# Patient Record
Sex: MALE | Race: WHITE
[De-identification: names, ages, dates, MRNs, and addresses within clinical notes are randomized per-mention and may not be internally consistent; named-entity substitution may affect disease eponyms.]

---

## 2021-06-17 ENCOUNTER — HOSPITAL ENCOUNTER (EMERGENCY)
Dept: HOSPITAL 63 - ER | Age: 56
Discharge: HOME | End: 2021-06-17
Payer: SELF-PAY

## 2021-06-17 VITALS — BODY MASS INDEX: 39.49 KG/M2 | WEIGHT: 260.59 LBS | HEIGHT: 68 IN

## 2021-06-17 VITALS — SYSTOLIC BLOOD PRESSURE: 156 MMHG | DIASTOLIC BLOOD PRESSURE: 88 MMHG

## 2021-06-17 DIAGNOSIS — N20.1: Primary | ICD-10-CM

## 2021-06-17 DIAGNOSIS — Z87.442: ICD-10-CM

## 2021-06-17 DIAGNOSIS — E11.9: ICD-10-CM

## 2021-06-17 LAB
ALBUMIN SERPL-MCNC: 4 G/DL (ref 3.4–5)
ALBUMIN/GLOB SERPL: 1.2 {RATIO} (ref 1–1.7)
ALP SERPL-CCNC: 76 U/L (ref 46–116)
ALT SERPL-CCNC: 70 U/L (ref 16–63)
ANION GAP SERPL CALC-SCNC: 12 MMOL/L (ref 6–14)
APTT PPP: (no result) S
AST SERPL-CCNC: 27 U/L (ref 15–37)
BACTERIA #/AREA URNS HPF: 0 /HPF
BASOPHILS # BLD AUTO: 0.1 X10^3/UL (ref 0–0.2)
BASOPHILS NFR BLD: 1 % (ref 0–3)
BILIRUB SERPL-MCNC: 0.5 MG/DL (ref 0.2–1)
BILIRUB UR QL STRIP: (no result)
BUN/CREAT SERPL: 13 (ref 6–20)
CA-I SERPL ISE-MCNC: 18 MG/DL (ref 8–26)
CALCIUM SERPL-MCNC: 9.4 MG/DL (ref 8.5–10.1)
CHLORIDE SERPL-SCNC: 106 MMOL/L (ref 98–107)
CO2 SERPL-SCNC: 25 MMOL/L (ref 21–32)
CREAT SERPL-MCNC: 1.4 MG/DL (ref 0.7–1.3)
EOSINOPHIL NFR BLD: 0.3 X10^3/UL (ref 0–0.7)
EOSINOPHIL NFR BLD: 2 % (ref 0–3)
ERYTHROCYTE [DISTWIDTH] IN BLOOD BY AUTOMATED COUNT: 14.4 % (ref 11.5–14.5)
FIBRINOGEN PPP-MCNC: (no result) MG/DL
GFR SERPLBLD BASED ON 1.73 SQ M-ARVRAT: 52.4 ML/MIN
GLOBULIN SER-MCNC: 3.4 G/DL (ref 2.2–3.8)
GLUCOSE SERPL-MCNC: 149 MG/DL (ref 70–99)
GLUCOSE UR STRIP-MCNC: (no result) MG/DL
HCT VFR BLD CALC: 44.9 % (ref 39–53)
HGB BLD-MCNC: 15.2 G/DL (ref 13–17.5)
LYMPHOCYTES # BLD: 1.7 X10^3/UL (ref 1–4.8)
LYMPHOCYTES NFR BLD AUTO: 12 % (ref 24–48)
MCH RBC QN AUTO: 28 PG (ref 25–35)
MCHC RBC AUTO-ENTMCNC: 34 G/DL (ref 31–37)
MCV RBC AUTO: 82 FL (ref 79–100)
MONO #: 0.9 X10^3/UL (ref 0–1.1)
MONOCYTES NFR BLD: 6 % (ref 0–9)
NEUT #: 11.2 X10^3UL (ref 1.8–7.7)
NEUTROPHILS NFR BLD AUTO: 79 % (ref 31–73)
NITRITE UR QL STRIP: (no result)
PLATELET # BLD AUTO: 245 X10^3/UL (ref 140–400)
POTASSIUM SERPL-SCNC: 3.6 MMOL/L (ref 3.5–5.1)
PROT SERPL-MCNC: 7.4 G/DL (ref 6.4–8.2)
RBC # BLD AUTO: 5.45 X10^6/UL (ref 4.3–5.7)
RBC #/AREA URNS HPF: >40 /HPF (ref 0–2)
SODIUM SERPL-SCNC: 143 MMOL/L (ref 136–145)
SP GR UR STRIP: >=1.03
SQUAMOUS #/AREA URNS LPF: (no result) /LPF
UROBILINOGEN UR-MCNC: 0.2 MG/DL
WBC # BLD AUTO: 14.2 X10^3/UL (ref 4–11)
WBC #/AREA URNS HPF: 0 /HPF (ref 0–4)

## 2021-06-17 PROCEDURE — 36415 COLL VENOUS BLD VENIPUNCTURE: CPT

## 2021-06-17 PROCEDURE — 96375 TX/PRO/DX INJ NEW DRUG ADDON: CPT

## 2021-06-17 PROCEDURE — 99284 EMERGENCY DEPT VISIT MOD MDM: CPT

## 2021-06-17 PROCEDURE — 85025 COMPLETE CBC W/AUTO DIFF WBC: CPT

## 2021-06-17 PROCEDURE — 81001 URINALYSIS AUTO W/SCOPE: CPT

## 2021-06-17 PROCEDURE — 74176 CT ABD & PELVIS W/O CONTRAST: CPT

## 2021-06-17 PROCEDURE — 96361 HYDRATE IV INFUSION ADD-ON: CPT

## 2021-06-17 PROCEDURE — 80053 COMPREHEN METABOLIC PANEL: CPT

## 2021-06-17 PROCEDURE — 96374 THER/PROPH/DIAG INJ IV PUSH: CPT

## 2021-06-17 NOTE — PHYS DOC
Past History


Past Medical History:  Diabetes, Kidney Stones


Additional Past Medical Histor:  BPH


Past Surgical History:  Tonsillectomy


Additional Past Surgical Histo:  lyphoma removal on back


Alcohol Use:  Rarely





General Adult


EDM:


Chief Complaint:  FLANK PAIN





HPI:


HPI:


56-year-old male presents with right flank pain.  The patient had sudden onset 

of right flank pain at work earlier today.  He was on his way to the hospital 

when the pain subsided so he went back to work.  The patient then had a second 

episode that was worse than the first.  He continues to have discomfort in the 

right flank radiating to the right groin.  His original pain was sharp and 8 out

of 10.  He is currently at a cramping pain 4 out of 10.  He did have one episode

of vomiting in the ED.  He has a history of kidney stones all of which have 

passed without intervention.  Denies fever or chills.  He has no other 

complaints at this time.





Review of Systems:


Review of Systems:


Constitutional:  Denies fever or chills 


Eyes:  Denies change in visual acuity 


HENT:  Denies nasal congestion or sore throat 


Respiratory:  Denies cough or shortness of breath 


Cardiovascular:  Denies chest pain or edema 


GI:  Denies abdominal pain, nausea, vomiting, bloody stools or diarrhea 


: Denies dysuria 


Musculoskeletal: Right flank pain


Integument:  Denies rash 


Neurologic:  Denies headache, focal weakness or sensory changes 


Endocrine:  Denies polyuria or polydipsia 


Lymphatic:  Denies swollen glands 


Psychiatric:  Denies depression or anxiety





Current Medications:


Current Meds:





Current Medications








 Medications


  (Trade)  Dose


 Ordered  Sig/Kecia  Start Time


 Stop Time Status Last Admin


Dose Admin


 


 Ondansetron HCl


  (Zofran)  4 mg  1X  ONCE  6/17/21 16:00


 6/17/21 16:03 DC  





 


 Sodium Chloride  1,000 ml @ 


 1,000 mls/hr  1X  ONCE  6/17/21 16:00


 6/17/21 16:59   





 


 Tamsulosin HCl


  (Flomax)  0.4 mg  1X  ONCE  6/17/21 16:15


 6/17/21 16:16 UNV  














Allergies:


Allergies:





Allergies








Coded Allergies Type Severity Reaction Last Updated Verified


 


  No Known Drug Allergies    6/17/21 No











Physical Exam:


PE:





Constitutional: Well developed, well nourished, morbidly obese, no acute 

distress, non-toxic appearance. []


HENT: Normocephalic, atraumatic, bilateral external ears normal, oropharynx 

moist, no oral exudates, nose normal. []


Eyes: PERRLA, EOMI, conjunctiva normal, no discharge. [] 


Neck: Normal range of motion, no tenderness, supple, no stridor. [] 


Cardiovascular: Heart rate regular rhythm, no murmur []


Lungs & Thorax:  Bilateral breath sounds clear to auscultation []


Abdomen: Bowel sounds normal, soft, no tenderness, no masses, no pulsatile 

masses. [] 


Skin: Warm, dry, no erythema, no rash. [] 


Back: No tenderness, mild right CVA tenderness. [] 


Extremities: No tenderness, no cyanosis, no clubbing, ROM intact, no edema. [] 


Neurologic: Alert and oriented X 3, normal motor function, normal sensory 

function, no focal deficits noted. []


Psychologic: Affect normal, judgement normal, mood normal. []





Current Patient Data:


Vital Signs:





                                   Vital Signs








  Date Time  Temp Pulse Resp B/P (MAP) Pulse Ox O2 Delivery O2 Flow Rate FiO2


 


6/17/21 15:53 98.0 71 18 160/92 (114) 98 Room Air  











EKG:


EKG:


[]





Radiology/Procedures:


Radiology/Procedures:


[]


Impressions:


Exam: CT of abdomen and pelvis without contrast





INDICATION: Right flank pain, hematuria





TECHNIQUE: Sequential axial images through the abdomen and pelvis obtained 

without IV contrast. Sagittal and coronal reformatted images were reconstructed 

from the axial data and reviewed.





Exposure: One or more of the following in the visualized dose reduction 

techniques were utilized for this examination:


1.  Automated exposure control


2.  Adjustment of the MA and/or KV according to patient size


3.  Use of iterative of reconstructive technique





Comparisons: None





FINDINGS:


Heart size is normal. No pericardial visualized lung bases are clear. No pleural

 effusion.





Diffuse hepatic steatosis. Spleen, pancreas and adrenals are unremarkable. 

Gallbladder is partially distended and not well evaluated.





There is mild right-sided hydronephrosis. There is a 5 mm calculus at the 

proximal right ureter. No other renal or ureteral calculi are identified.





Bladder is partially distended and not well evaluated. Prostate is not enlarged.





Large and small bowel are unremarkable. Appendix is normal. No free intra-

abdominal air or fluid. No obstruction.





Abdominal aorta has a normal course and caliber.





No enlarged intra-abdominal lymph nodes are identified.





Small fat-containing inguinal hernia.





No suspicious osseous lesions or acute fractures.





IMPRESSION:


1.  A 5 mm callus at the proximal right ureter. There is mild associated right-

sided hydronephrosis.


2.  Diffuse hepatic steatosis.


3.  Small fat-containing inguinal hernia.








Electronically signed by: Bryant Duenas MD (6/17/2021 4:57 PM) [facility]














DICTATED AND SIGNED BY:     BRYANT DUENAS MD


DATE:     06/17/21 1649





CC: DORIS RENEE DO; DIYA MACEDO ~MTH0 0





Heart Score:


C/O Chest Pain:  N/A


Risk Factors:


Risk Factors:  DM, Current or recent (<one month) smoker, HTN, HLP, family 

history of CAD, obesity.


Risk Scores:


Score 0 - 3:  2.5% MACE over next 6 weeks - Discharge Home


Score 4 - 6:  20.3% MACE over next 6 weeks - Admit for Clinical Observation


Score 7 - 10:  72.7% MACE over next 6 weeks - Early Invasive Strategies





Course & Med Decision Making:


Course & Med Decision Making


Pertinent Labs and Imaging studies reviewed. (See chart for details)


The patient's labs are unremarkable except for creatinine of 1.4.  I have no 

previous for comparison.  CT scan does show a 5 mm stone in the distal ureter.  

See official read for more details.  There is still a chance that this will 

pass.  I will discharge the patient with Norco 5/325 and Flomax.  I have 

encouraged him to drink lots of water.  I also advised that he let his urologist

 know about his CT read in case patient needs follow-up procedure.  He is stable

 for discharge at this time.


[]





Dragon Disclaimer:


Dragon Disclaimer:


This electronic medical record was generated, in whole or in part, using a voice

 recognition dictation system.





Departure


Departure:


Impression:  


   Primary Impression:  


   Ureterolithiasis


Disposition:  01 HOME / SELF CARE / HOMELESS


Condition:  STABLE


Referrals:  


DIYA MACEDO (PCP)


Patient Instructions:  Kidney Stones, Easy-to-Read


Scripts


Tamsulosin Hcl (FLOMAX) 0.4 Mg Cap.er.24h


1 CAP PO DAILY for kidney stone, #10 CAP 0 Refills


   Prov: DORIS RENEE DO         6/17/21 


Hydrocodone/Acetaminophen (Hydrocodone-Acetamin 5-325 mg) 1 Each Tablet


1 EACH PO Q4-6HRS PRN for PAIN, #10 TAB


   Prov: DORIS RENEE DO         6/17/21











DORIS RENEE DO                 Jun 17, 2021 16:11

## 2021-06-17 NOTE — RAD
Exam: CT of abdomen and pelvis without contrast



INDICATION: Right flank pain, hematuria



TECHNIQUE: Sequential axial images through the abdomen and pelvis obtained without IV contrast. Sagit
alysha and coronal reformatted images were reconstructed from the axial data and reviewed.



Exposure: One or more of the following in the visualized dose reduction techniques were utilized for 
this examination:

1.  Automated exposure control

2.  Adjustment of the MA and/or KV according to patient size

3.  Use of iterative of reconstructive technique



Comparisons: None



FINDINGS:

Heart size is normal. No pericardial visualized lung bases are clear. No pleural effusion.



Diffuse hepatic steatosis. Spleen, pancreas and adrenals are unremarkable. Gallbladder is partially d
istended and not well evaluated.



There is mild right-sided hydronephrosis. There is a 5 mm calculus at the proximal right ureter. No o
ther renal or ureteral calculi are identified.



Bladder is partially distended and not well evaluated. Prostate is not enlarged.



Large and small bowel are unremarkable. Appendix is normal. No free intra-abdominal air or fluid. No 
obstruction.



Abdominal aorta has a normal course and caliber.



No enlarged intra-abdominal lymph nodes are identified.



Small fat-containing inguinal hernia.



No suspicious osseous lesions or acute fractures.



IMPRESSION:

1.  A 5 mm callus at the proximal right ureter. There is mild associated right-sided hydronephrosis.

2.  Diffuse hepatic steatosis.

3.  Small fat-containing inguinal hernia.





Electronically signed by: Bryant Redd MD (6/17/2021 4:57 PM) [facility]

## 2021-12-06 ENCOUNTER — HOSPITAL ENCOUNTER (EMERGENCY)
Dept: HOSPITAL 63 - ER | Age: 56
Discharge: HOME | End: 2021-12-06
Payer: OTHER GOVERNMENT

## 2021-12-06 VITALS — HEIGHT: 68 IN | WEIGHT: 265.22 LBS | BODY MASS INDEX: 40.2 KG/M2

## 2021-12-06 VITALS — DIASTOLIC BLOOD PRESSURE: 88 MMHG | SYSTOLIC BLOOD PRESSURE: 146 MMHG

## 2021-12-06 DIAGNOSIS — Z87.442: ICD-10-CM

## 2021-12-06 DIAGNOSIS — N13.2: Primary | ICD-10-CM

## 2021-12-06 DIAGNOSIS — Z91.013: ICD-10-CM

## 2021-12-06 DIAGNOSIS — E11.9: ICD-10-CM

## 2021-12-06 LAB
% BANDS: 4 % (ref 0–9)
% LYMPHS: 7 % (ref 24–48)
% MONOS: 2 % (ref 0–10)
% SEGS: 85 % (ref 35–66)
ALBUMIN SERPL-MCNC: 3.9 G/DL (ref 3.4–5)
ALBUMIN/GLOB SERPL: 1.2 {RATIO} (ref 1–1.7)
ALP SERPL-CCNC: 66 U/L (ref 46–116)
ALT SERPL-CCNC: 58 U/L (ref 16–63)
ANION GAP SERPL CALC-SCNC: 12 MMOL/L (ref 6–14)
APTT PPP: YELLOW S
AST SERPL-CCNC: 23 U/L (ref 15–37)
BACTERIA #/AREA URNS HPF: 0 /HPF
BASOPHILS # BLD AUTO: 0.1 X10^3/UL (ref 0–0.2)
BASOPHILS NFR BLD AUTO: 2 % (ref 0–3)
BASOPHILS NFR BLD: 1 % (ref 0–3)
BILIRUB SERPL-MCNC: 0.5 MG/DL (ref 0.2–1)
BILIRUB UR QL STRIP: (no result)
BUN/CREAT SERPL: 11 (ref 6–20)
CA-I SERPL ISE-MCNC: 17 MG/DL (ref 8–26)
CALCIUM SERPL-MCNC: 8.9 MG/DL (ref 8.5–10.1)
CHLORIDE SERPL-SCNC: 99 MMOL/L (ref 98–107)
CO2 SERPL-SCNC: 24 MMOL/L (ref 21–32)
CREAT SERPL-MCNC: 1.6 MG/DL (ref 0.7–1.3)
EOSINOPHIL NFR BLD: 0 % (ref 0–3)
EOSINOPHIL NFR BLD: 0 X10^3/UL (ref 0–0.7)
ERYTHROCYTE [DISTWIDTH] IN BLOOD BY AUTOMATED COUNT: 14.3 % (ref 11.5–14.5)
FIBRINOGEN PPP-MCNC: CLEAR MG/DL
GFR SERPLBLD BASED ON 1.73 SQ M-ARVRAT: 44.9 ML/MIN
GLOBULIN SER-MCNC: 3.3 G/DL (ref 2.2–3.8)
GLUCOSE SERPL-MCNC: 162 MG/DL (ref 70–99)
GLUCOSE UR STRIP-MCNC: (no result) MG/DL
HCT VFR BLD CALC: 44.3 % (ref 39–53)
HGB BLD-MCNC: 14.9 G/DL (ref 13–17.5)
LYMPHOCYTES # BLD: 0.8 X10^3/UL (ref 1–4.8)
LYMPHOCYTES NFR BLD AUTO: 5 % (ref 24–48)
MCH RBC QN AUTO: 27 PG (ref 25–35)
MCHC RBC AUTO-ENTMCNC: 34 G/DL (ref 31–37)
MCV RBC AUTO: 81 FL (ref 79–100)
MONO #: 0.4 X10^3/UL (ref 0–1.1)
MONOCYTES NFR BLD: 3 % (ref 0–9)
NEUT #: 14.9 X10^3UL (ref 1.8–7.7)
NEUTROPHILS NFR BLD AUTO: 91 % (ref 31–73)
NITRITE UR QL STRIP: (no result)
PLATELET # BLD AUTO: 245 X10^3/UL (ref 140–400)
PLATELET # BLD EST: ADEQUATE 10*3/UL
POTASSIUM SERPL-SCNC: 4 MMOL/L (ref 3.5–5.1)
PROT SERPL-MCNC: 7.2 G/DL (ref 6.4–8.2)
RBC # BLD AUTO: 5.45 X10^6/UL (ref 4.3–5.7)
RBC #/AREA URNS HPF: (no result) /HPF (ref 0–2)
SODIUM SERPL-SCNC: 135 MMOL/L (ref 136–145)
SP GR UR STRIP: >=1.03
UROBILINOGEN UR-MCNC: 0.2 MG/DL
WBC # BLD AUTO: 16.3 X10^3/UL (ref 4–11)
WBC #/AREA URNS HPF: 0 /HPF (ref 0–4)

## 2021-12-06 PROCEDURE — 96375 TX/PRO/DX INJ NEW DRUG ADDON: CPT

## 2021-12-06 PROCEDURE — 99284 EMERGENCY DEPT VISIT MOD MDM: CPT

## 2021-12-06 PROCEDURE — 85025 COMPLETE CBC W/AUTO DIFF WBC: CPT

## 2021-12-06 PROCEDURE — 80053 COMPREHEN METABOLIC PANEL: CPT

## 2021-12-06 PROCEDURE — 85007 BL SMEAR W/DIFF WBC COUNT: CPT

## 2021-12-06 PROCEDURE — 96361 HYDRATE IV INFUSION ADD-ON: CPT

## 2021-12-06 PROCEDURE — 74176 CT ABD & PELVIS W/O CONTRAST: CPT

## 2021-12-06 PROCEDURE — 81001 URINALYSIS AUTO W/SCOPE: CPT

## 2021-12-06 PROCEDURE — 36415 COLL VENOUS BLD VENIPUNCTURE: CPT

## 2021-12-06 PROCEDURE — 96374 THER/PROPH/DIAG INJ IV PUSH: CPT

## 2021-12-06 NOTE — RAD
EXAMINATION: CT ABDOMEN+PELVIS WO



CLINICAL HISTORY: Right flank pain, history of kidney stones



TECHNIQUE: Imaging of the abdomen and pelvis was performed without intravenous contrast using standar
d technique, scanning from just above the dome of the diaphragm to the symphysis pubis.  Unenhanced i
maging is limited for the evaluation of some intra-abdominal and pelvic pathology.



CT Dose Reduction Employed: One or more of the following individualized dose reduction techniques wer
e utilized for this examination:  1. Automated exposure control  2. Adjustment of the mA and/or kV ac
cording to patient size  3. Use of iterative reconstruction technique.



COMPARISON: 6/17/2021





FINDINGS:



Minimal dependent bibasilar subsegmental atelectasis.



Diffuse hypoattenuation of the hepatic parenchyma, compatible with steatosis.. Gallbladder, pancreas,
 spleen, and adrenal glands unremarkable.



6 mm calculus in the proximal right ureter with mild hydronephrosis, similar to prior study, with inc
reased perinephric stranding compared to the prior study. No additional urinary calculi visualized.



Minimally distended urinary bladder suboptimally evaluated. Moderate to markedly enlarged prostate wi
th small calcification posteriorly.



No dilated bowel. Appendix within normal limits.



Mild arterial atherosclerotic calcification without aneurysm.



1.9 cm heterogeneous sclerotic density in the right posterolateral aspect of the L4 vertebral body, n
onspecific. 4 mm sclerotic density in the posterior right ilium, possibly a bone island, and patchy s
clerosis in the posterior left ilium underlying the SI joint, nonspecific.





IMPRESSION:



6 mm calculus in the proximal right ureter with mild hydronephrosis, similar to prior study, with inc
reased perinephric stranding.



Enlarged prostate, correlate with PSA level.



Nonspecific sclerotic densities in the L4 vertebral body and bilateral nicholas as described, cannot enti
rely exclude metastatic lesions in the presence of prostatomegaly.



Electronically signed by: Armani Cardenas DO (12/6/2021 6:21 PM) [facility]

## 2021-12-06 NOTE — PHYS DOC
Past History


Past Medical History:  Diabetes, Kidney Stones


Additional Past Medical Histor:  BPH


 (BRIDGET KHAN)


Past Surgical History:  Tonsillectomy


Additional Past Surgical Histo:  lyphoma removal on back


 (BRIDGET KHAN)


Alcohol Use:  Rarely


 (BRIDGET KHAN)





General Adult


EDM:


Chief Complaint:  FLANK PAIN





HPI:


HPI:


Patient is a 56-year-old male who presents to the emergency department today for

right flank pain that radiates to his right lower abdomen that started this 

afternoon.  Patient rates pain 7 out of 10.  He is also reporting decreased 

urine output and nausea with 2 episodes of vomiting.  Patient took Tylenol and 

hydrocodone prior to arrival.  Patient reports that he has a history of kidney 

stones and that this feels just like his kidney stones.  He also has a history 

of BPH.  Patient denies any dysuria, hematuria, urinary frequency/urgency, blood

in his vomit or stools.  His vital signs are stable and he is afebrile.


 (BRIDGET HKAN)





Review of Systems:


Review of Systems:


Constitutional: See HPI


GI: See HPI


: See HPI


Musculoskeletal: See HPI


 (BRIDGET KHAN)





Allergies:


Allergies:





Allergies








Uncoded Allergies Type Severity Reaction Last Updated Verified


 


  seafood Allergy Severe soft pallette swelling, chest pain 12/6/21 








 (BRIDGET KHAN)





Physical Exam:


PE:





Constitutional: Well developed, well nourished, no acute distress, non-toxic 

appearance. []


HENT: Normocephalic, atraumatic, bilateral external ears normal, oropharynx 

moist, no oral exudates, nose normal. []


Eyes: PERRL, EOMI, conjunctiva normal, no discharge. [] 


Neck: Normal range of motion, no stridor


Cardiovascular:Heart rate regular rhythm, no murmur []


Lungs & Thorax:  Bilateral breath sounds clear to auscultation []


Abdomen: Bowel sounds normal, soft, no tenderness, obese, no masses, no 

pulsatile masses. [] 


Skin: Warm, dry, no erythema, no rash. [] 


Back: No tenderness, right-sided CVA tenderness


Extremities: No tenderness, no cyanosis, no clubbing, ROM intact, no edema. [] 


Neurologic: Alert and oriented X 3, normal motor function, normal sensory 

function, no focal deficits noted. []


Psychologic: Affect normal, judgement normal, mood normal. []


 (BRIDGET KHAN APRN)





Current Patient Data:


Labs:





Laboratory Tests








Test


 12/6/21


17:52


 


White Blood Count 16.3 x10^3/uL 


 


Red Blood Count 5.45 x10^6/uL 


 


Hemoglobin 14.9 g/dL 


 


Hematocrit 44.3 % 


 


Mean Corpuscular Volume 81 fL 


 


Mean Corpuscular Hemoglobin 27 pg 


 


Mean Corpuscular Hemoglobin


Concent 34 g/dL 





 


Red Cell Distribution Width 14.3 % 


 


Platelet Count 245 x10^3/uL 


 


Neutrophils (%) (Auto) 91 % 


 


Lymphocytes (%) (Auto) 5 % 


 


Monocytes (%) (Auto) 3 % 


 


Eosinophils (%) (Auto) 0 % 


 


Basophils (%) (Auto) 1 % 


 


Neutrophils # (Auto) 14.9 x10^3uL 


 


Lymphocytes # (Auto) 0.8 x10^3/uL 


 


Monocytes # (Auto) 0.4 x10^3/uL 


 


Eosinophils # (Auto) 0.0 x10^3/uL 


 


Basophils # (Auto) 0.1 x10^3/uL 


 


Segmented Neutrophils % 85 % 


 


Band Neutrophils % 4 % 


 


Lymphocytes % 7 % 


 


Monocytes % 2 % 


 


Basophils % 2 % 


 


Platelet Estimate Adequate 


 


Urine Collection Type Clean catch 


 


Urine Color Yellow 


 


Urine Clarity Clear 


 


Urine pH 5.5 


 


Urine Specific Gravity >=1.030 


 


Urine Protein 30 mg/dl 


 


Urine Glucose (UA) Neg mg/dL 


 


Urine Ketones (Stick) Trace mg/dL 


 


Urine Blood Small 


 


Urine Nitrite Neg 


 


Urine Bilirubin Neg 


 


Urine Urobilinogen Dipstick 0.2 mg/dL 


 


Urine Leukocyte Esterase Neg 


 


Urine RBC Occ /HPF 


 


Urine WBC 0 /HPF 


 


Urine Bacteria 0 /HPF 


 


Sodium Level 135 mmol/L 


 


Potassium Level 4.0 mmol/L 


 


Chloride Level 99 mmol/L 


 


Carbon Dioxide Level 24 mmol/L 


 


Anion Gap 12 


 


Blood Urea Nitrogen 17 mg/dL 


 


Creatinine 1.6 mg/dL 


 


Estimated GFR


(Cockcroft-Gault) 44.9 





 


BUN/Creatinine Ratio 11 


 


Glucose Level 162 mg/dL 


 


Calcium Level 8.9 mg/dL 


 


Total Bilirubin 0.5 mg/dL 


 


Aspartate Amino Transf


(AST/SGOT) 23 U/L 





 


Alanine Aminotransferase


(ALT/SGPT) 58 U/L 





 


Alkaline Phosphatase 66 U/L 


 


Total Protein 7.2 g/dL 


 


Albumin 3.9 g/dL 


 


Albumin/Globulin Ratio 1.2 








Current Medications








 Medications


  (Trade)  Dose


 Ordered  Sig/Kecia


 Route


 PRN Reason  Start Time


 Stop Time Status Last Admin


Dose Admin


 


 Sodium Chloride  1,000 ml @ 


 1,000 mls/hr  Q1H


 IV


   12/6/21 17:45


 12/6/21 18:44 DC 12/6/21 17:45





 


 Fentanyl Citrate


  (Fentanyl 2ml


 Vial)  50 mcg  1X  ONCE


 IVP


   12/6/21 17:45


 12/6/21 17:56 DC 12/6/21 18:05





 


 Ondansetron HCl


  (Zofran)  4 mg  1X  ONCE


 IVP


   12/6/21 17:45


 12/6/21 17:56 DC 12/6/21 18:03





 


 Ketorolac


 Tromethamine


  (Toradol 30mg


 Vial)  15 mg  1X  ONCE


 IVP


   12/6/21 17:45


 12/6/21 17:56 DC 12/6/21 17:45





 


 Sodium Chloride  1,000 ml @ 


 1,000 mls/hr  1X  ONCE


 IV


   12/6/21 19:15


 12/6/21 20:14  12/6/21 19:09











 (BRIDGET KHAN)





EKG:


EKG:


[]


 (BRIDGET KHAN)





Radiology/Procedures:


Radiology/Procedures:


[]PROCEDURE: CT ABDOMEN PELVIS WO CONTRAST





EXAMINATION: CT ABDOMEN+PELVIS WO





CLINICAL HISTORY: Right flank pain, history of kidney stones





TECHNIQUE: Imaging of the abdomen and pelvis was performed without intravenous 

contrast using standard technique, scanning from just above the dome of the 

diaphragm to the symphysis pubis.  Unenhanced imaging is limited for the 

evaluation of some intra-abdominal and pelvic pathology.





CT Dose Reduction Employed: One or more of the following individualized dose 

reduction techniques were utilized for this examination:  1. Automated exposure 

control  2. Adjustment of the mA and/or kV according to patient size  3. Use of 

iterative reconstruction technique.





COMPARISON: 6/17/2021








FINDINGS:





Minimal dependent bibasilar subsegmental atelectasis.





Diffuse hypoattenuation of the hepatic parenchyma, compatible with steatosis.. 

Gallbladder, pancreas, spleen, and adrenal glands unremarkable.





6 mm calculus in the proximal right ureter with mild hydronephrosis, similar to 

prior study, with increased perinephric stranding compared to the prior study. 

No additional urinary calculi visualized.





Minimally distended urinary bladder suboptimally evaluated. Moderate to markedly

 enlarged prostate with small calcification posteriorly.





No dilated bowel. Appendix within normal limits.





Mild arterial atherosclerotic calcification without aneurysm.





1.9 cm heterogeneous sclerotic density in the right posterolateral aspect of the

 L4 vertebral body, nonspecific. 4 mm sclerotic density in the posterior right 

ilium, possibly a bone island, and patchy sclerosis in the posterior left ilium 

underlying the SI joint, nonspecific.








IMPRESSION:





6 mm calculus in the proximal right ureter with mild hydronephrosis, similar to 

prior study, with increased perinephric stranding.





Enlarged prostate, correlate with PSA level.





Nonspecific sclerotic densities in the L4 vertebral body and bilateral nicholas as 

described, cannot entirely exclude metastatic lesions in the presence of 

prostatomegaly.





Electronically signed by: Armani Noguera DO (12/6/2021 6:21 PM) [facility]














DICTATED AND SIGNED BY:     ARMANI NOGUERA DO


DATE:     12/06/21 1811





CC: SAILAJA ANDRADE; BRIDGET KHAN ~MTH0 0


 (BRIDGET KHAN)





Heart Score:


C/O Chest Pain:  N/A


Risk Factors:


Risk Factors:  DM, Current or recent (<one month) smoker, HTN, HLP, family 

history of CAD, obesity.


Risk Scores:


Score 0 - 3:  2.5% MACE over next 6 weeks - Discharge Home


Score 4 - 6:  20.3% MACE over next 6 weeks - Admit for Clinical Observation


Score 7 - 10:  72.7% MACE over next 6 weeks - Early Invasive Strategies


 (BRIDGET KHAN)





Course & Med Decision Making:


Course & Med Decision Making


Pertinent Labs and Imaging studies reviewed. (See chart for details)





[] Patient presents to the emergency department for right flank pain.  Patient 

has a history of kidney stones and states that this feels just like his kidney 

stones in the past.  Work-up in the ER consisted of blood work, urinalysis and 

CT imaging of abdomen and pelvis.  Patient treated with IV fluids, nausea and 

pain medication.  Patient reports improvement in his symptoms following 

treatment in the emergency department.  He was noted to have mild leukocytosis 

with a white blood cell count of 16.3.  Patient does have prostate cancer.  

Patient's creatinine was 1.6 presents with his consistent with previous lab 

findings.  Urinalysis did not show urinary tract infection.  His CT scan of his 

abdomen did show a 6 mm stone in the right ureter with perinephric stranding.  

Patient reports that he has a urologist that he follows up with due to his 

prostate cancer.  Patient will be discharged home with nausea medication, Flomax

 and an antibiotic.  He is advised to contact his urologist first thing in the 

morning to follow-up.  Patient's vital signs are stable and he is afebrile.  I 

discussed possible treatment options with patient and he is agreeable to 

discharge home.I discussed patients case with supervising physician.  I 

discussed with patient all findings and diagnostic testing as well as the need 

to follow-up with PCP for further evaluation and treatment or return to the ER 

if any new or worsening symptoms.  Strict return precautions were also discussed

 at length.  Patient voiced understanding and agreement with the plan.  Patient 

is hemodynamically stable at the time of disposition.


 (BRIDGET KHAN)





Dragon Disclaimer:


Dragon Disclaimer:


This electronic medical record was generated, in whole or in part, using a voice

 recognition dictation system.


 (BRIDGET KHAN)





Departure


Departure:


Impression:  


   Primary Impression:  


   Kidney stone


Disposition:  01 HOME / SELF CARE / HOMELESS


Condition:  GOOD


Referrals:  


SAILAJA ANDRADE (PCP)


Patient Instructions:  Kidney Stones





Additional Instructions:  


You were seen in the emergency department today for flank pain.  You have a 6 mm

 kidney stone on the right side.  You are being discharged home with pain 

medication, for mild pain you can take ibuprofen but if you have severe pain 

please take this medication.  This medication is hydrocodone and Tylenol and a 

combination tablet.  This medication may cause drowsiness so do not take when 

you need to be alert, driving a vehicle or with alcohol..   To help with the 

stone expulsion we are sending you home with Flomax this will dilate your ureter

 and help the stone pass.  Please strain your urines at home to check for 

passage of the stone.  You are also being discharged home with an antibiotic.  

Please start and finish it completely.  You need to follow-up with your 

urologist first thing in the morning.  Please call them as soon as possible to 

set up an appointment.  You may require a stent or lithotripsy for the stone if 

it does not pass.  Please go to a hospital with urology coverage like [facility]

 if you develop worsening of your pain, intractable nausea or vomiting, high 

fevers refractory to treatment or any new or worsening concerns.


Scripts


Hydrocodone Bit/Acetaminophen (HYDROCODONE-APAP 5-325  **) 1 Each Tablet


1 TAB PO PRN Q6HRS PRN for PAIN for 2 Days, #8 TAB 0 Refills


   Prov: BRIDGET KHAN APRN         12/6/21 


Ciprofloxacin Hcl (CIPROFLOXACIN HCL) 500 Mg Tablet


1 TAB PO BID for infection for 7 Days, #14 TAB 0 Refills


   Prov: BRIDGET KHAN APRN         12/6/21 


Tamsulosin Hcl (FLOMAX) 0.4 Mg Cap.er.24h


1 CAP PO DAILY for kidney stone for 14 Days, #14 CAP 0 Refills


   Prov: BRIDGET KHAN         12/6/21





Attending Co-Sign


Attending Co-Sign


The patient was seen and interviewed as well as examined at the bedside. The 

chart was reviewed. The case was discussed. Agree with the plan of care.


 (JONATHAN WYATT MD)











BRIDGET KHAN           Dec 6, 2021 17:43


JONATHAN WYATT MD           Dec 10, 2021 21:14